# Patient Record
Sex: FEMALE | Race: OTHER
[De-identification: names, ages, dates, MRNs, and addresses within clinical notes are randomized per-mention and may not be internally consistent; named-entity substitution may affect disease eponyms.]

---

## 2024-03-01 ENCOUNTER — NON-APPOINTMENT (OUTPATIENT)
Age: 29
End: 2024-03-01

## 2024-03-01 PROBLEM — Z00.00 ENCOUNTER FOR PREVENTIVE HEALTH EXAMINATION: Status: ACTIVE | Noted: 2024-03-01

## 2024-03-04 ENCOUNTER — NON-APPOINTMENT (OUTPATIENT)
Age: 29
End: 2024-03-04

## 2024-03-04 ENCOUNTER — APPOINTMENT (OUTPATIENT)
Dept: SURGERY | Facility: CLINIC | Age: 29
End: 2024-03-04
Payer: COMMERCIAL

## 2024-03-04 ENCOUNTER — RESULT REVIEW (OUTPATIENT)
Age: 29
End: 2024-03-04

## 2024-03-04 ENCOUNTER — TRANSCRIPTION ENCOUNTER (OUTPATIENT)
Age: 29
End: 2024-03-04

## 2024-03-04 VITALS
TEMPERATURE: 98 F | HEIGHT: 63 IN | WEIGHT: 140 LBS | SYSTOLIC BLOOD PRESSURE: 131 MMHG | DIASTOLIC BLOOD PRESSURE: 97 MMHG | HEART RATE: 71 BPM | BODY MASS INDEX: 24.8 KG/M2

## 2024-03-04 DIAGNOSIS — F31.9 BIPOLAR DISORDER, UNSPECIFIED: ICD-10-CM

## 2024-03-04 DIAGNOSIS — Z86.59 PERSONAL HISTORY OF OTHER MENTAL AND BEHAVIORAL DISORDERS: ICD-10-CM

## 2024-03-04 DIAGNOSIS — K64.4 RESIDUAL HEMORRHOIDAL SKIN TAGS: ICD-10-CM

## 2024-03-04 PROCEDURE — 11770 EXC PILONIDAL CYST SIMPLE: CPT

## 2024-03-04 RX ORDER — GUAIFENESIN DEXTROMETHORPHAN 400; 20 MG/1; MG/1
TABLET ORAL
Refills: 0 | Status: ACTIVE | COMMUNITY

## 2024-03-04 RX ORDER — LAMOTRIGINE 25 MG/1
TABLET ORAL
Refills: 0 | Status: ACTIVE | COMMUNITY

## 2024-03-04 RX ORDER — LITHIUM CARBONATE 300 MG/1
TABLET ORAL
Refills: 0 | Status: ACTIVE | COMMUNITY

## 2024-03-04 RX ORDER — PROPRANOLOL HYDROCHLORIDE 80 MG/1
TABLET ORAL
Refills: 0 | Status: ACTIVE | COMMUNITY

## 2024-03-04 NOTE — ASSESSMENT
[FreeTextEntry1] : 27 yo F w/ long standing pilonidal disease and anal skin tag.  We discussed management options including gips or operative excision.  Risks, benefits and alternatives discussed.  Patient wishes to proceed with gips.  - BID shower - gauze to the area - follow up in 1 week

## 2024-03-04 NOTE — PROCEDURE
[FreeTextEntry1] : After informed consent was signed by the patient and witnessed by the medical assistant, the area was prepped with Betadine and 10cc of 1% lidocaine with epinephrine were used to anesthetize the skin around the pilonidal and at the area of an anal skin tag.  The anal skin tag was excised with scissors and sent to pathology.  A 6 mm punch was used to enlarge the superior midline sinus opening. The cavity was curettaged with a 2 mm curette to remove all epithelial tissue lining this cavity.  There was no second communicating sinus identifies.  The tract was irrigated and hemostasis confirmed. The cavities were packed with 1/4 inch packing strip and covered with gauze and tegaderm. Patient tolerated the procedure well.

## 2024-03-05 ENCOUNTER — NON-APPOINTMENT (OUTPATIENT)
Age: 29
End: 2024-03-05

## 2024-03-08 ENCOUNTER — NON-APPOINTMENT (OUTPATIENT)
Age: 29
End: 2024-03-08

## 2024-03-11 ENCOUNTER — NON-APPOINTMENT (OUTPATIENT)
Age: 29
End: 2024-03-11

## 2024-03-11 ENCOUNTER — APPOINTMENT (OUTPATIENT)
Dept: SURGERY | Facility: CLINIC | Age: 29
End: 2024-03-11
Payer: COMMERCIAL

## 2024-03-11 VITALS — SYSTOLIC BLOOD PRESSURE: 114 MMHG | TEMPERATURE: 97.4 F | HEART RATE: 59 BPM | DIASTOLIC BLOOD PRESSURE: 61 MMHG

## 2024-03-11 DIAGNOSIS — L05.92 PILONIDAL SINUS W/OUT ABSCESS: ICD-10-CM

## 2024-03-11 PROCEDURE — 99024 POSTOP FOLLOW-UP VISIT: CPT

## 2024-03-11 NOTE — HISTORY OF PRESENT ILLNESS
[de-identified] : post op GIPS and excision of anal skin tag- pathology reviewed [de-identified] : pt had some increasing irritation on friday and was started on keflex.  she states her symptoms have improved with the abx.  she is taking bid showers.

## 2024-03-11 NOTE — PHYSICAL EXAM
[No Rash or Lesion] : No rash or lesion [Alert] : alert [Calm] : calm [de-identified] : NAD [de-identified] : pilonidal GIPS site healing well, no erythema or drainage

## 2024-04-15 ENCOUNTER — APPOINTMENT (OUTPATIENT)
Dept: SURGERY | Facility: CLINIC | Age: 29
End: 2024-04-15

## 2025-01-09 ENCOUNTER — APPOINTMENT (OUTPATIENT)
Dept: SURGERY | Facility: CLINIC | Age: 30
End: 2025-01-09
Payer: COMMERCIAL

## 2025-01-09 ENCOUNTER — RESULT REVIEW (OUTPATIENT)
Age: 30
End: 2025-01-09

## 2025-01-09 DIAGNOSIS — L91.8 OTHER HYPERTROPHIC DISORDERS OF THE SKIN: ICD-10-CM

## 2025-01-09 PROCEDURE — 11200 RMVL SKIN TAGS UP TO&INC 15: CPT

## 2025-01-09 PROCEDURE — 88304 TISSUE EXAM BY PATHOLOGIST: CPT | Mod: 26
